# Patient Record
Sex: MALE | URBAN - METROPOLITAN AREA
[De-identification: names, ages, dates, MRNs, and addresses within clinical notes are randomized per-mention and may not be internally consistent; named-entity substitution may affect disease eponyms.]

---

## 2017-08-01 ENCOUNTER — EMERGENCY (EMERGENCY)
Facility: HOSPITAL | Age: 77
LOS: 1 days | Discharge: HOME | End: 2017-08-01
Attending: EMERGENCY MEDICINE | Admitting: EMERGENCY MEDICINE
Payer: MEDICARE

## 2017-08-01 VITALS
RESPIRATION RATE: 18 BRPM | OXYGEN SATURATION: 96 % | DIASTOLIC BLOOD PRESSURE: 93 MMHG | SYSTOLIC BLOOD PRESSURE: 164 MMHG | TEMPERATURE: 99 F | HEART RATE: 89 BPM

## 2017-08-01 VITALS
TEMPERATURE: 98 F | RESPIRATION RATE: 18 BRPM | DIASTOLIC BLOOD PRESSURE: 94 MMHG | OXYGEN SATURATION: 99 % | SYSTOLIC BLOOD PRESSURE: 148 MMHG | HEART RATE: 66 BPM

## 2017-08-01 DIAGNOSIS — R53.1 WEAKNESS: ICD-10-CM

## 2017-08-01 LAB
ALBUMIN SERPL ELPH-MCNC: 4 G/DL — SIGNIFICANT CHANGE UP (ref 3.3–5)
ALP SERPL-CCNC: 85 U/L — SIGNIFICANT CHANGE UP (ref 40–120)
ALT FLD-CCNC: 17 U/L RC — SIGNIFICANT CHANGE UP (ref 10–45)
ANION GAP SERPL CALC-SCNC: 11 MMOL/L — SIGNIFICANT CHANGE UP (ref 5–17)
APTT BLD: 34.6 SEC — SIGNIFICANT CHANGE UP (ref 27.5–37.4)
AST SERPL-CCNC: 17 U/L — SIGNIFICANT CHANGE UP (ref 10–40)
BILIRUB SERPL-MCNC: 0.8 MG/DL — SIGNIFICANT CHANGE UP (ref 0.2–1.2)
BUN SERPL-MCNC: 18 MG/DL — SIGNIFICANT CHANGE UP (ref 7–23)
CALCIUM SERPL-MCNC: 9.9 MG/DL — SIGNIFICANT CHANGE UP (ref 8.4–10.5)
CHLORIDE SERPL-SCNC: 104 MMOL/L — SIGNIFICANT CHANGE UP (ref 96–108)
CO2 SERPL-SCNC: 27 MMOL/L — SIGNIFICANT CHANGE UP (ref 22–31)
CREAT SERPL-MCNC: 1.2 MG/DL — SIGNIFICANT CHANGE UP (ref 0.5–1.3)
GLUCOSE SERPL-MCNC: 157 MG/DL — HIGH (ref 70–99)
HCT VFR BLD CALC: 47.7 % — SIGNIFICANT CHANGE UP (ref 39–50)
HGB BLD-MCNC: 15.7 G/DL — SIGNIFICANT CHANGE UP (ref 13–17)
INR BLD: 1.22 RATIO — HIGH (ref 0.88–1.16)
MCHC RBC-ENTMCNC: 28.9 PG — SIGNIFICANT CHANGE UP (ref 27–34)
MCHC RBC-ENTMCNC: 32.8 GM/DL — SIGNIFICANT CHANGE UP (ref 32–36)
MCV RBC AUTO: 88 FL — SIGNIFICANT CHANGE UP (ref 80–100)
PLATELET # BLD AUTO: 182 K/UL — SIGNIFICANT CHANGE UP (ref 150–400)
POTASSIUM SERPL-MCNC: 4.7 MMOL/L — SIGNIFICANT CHANGE UP (ref 3.5–5.3)
POTASSIUM SERPL-SCNC: 4.7 MMOL/L — SIGNIFICANT CHANGE UP (ref 3.5–5.3)
PROT SERPL-MCNC: 7.1 G/DL — SIGNIFICANT CHANGE UP (ref 6–8.3)
PROTHROM AB SERPL-ACNC: 13.3 SEC — HIGH (ref 9.8–12.7)
RBC # BLD: 5.42 M/UL — SIGNIFICANT CHANGE UP (ref 4.2–5.8)
RBC # FLD: 13.7 % — SIGNIFICANT CHANGE UP (ref 10.3–14.5)
SODIUM SERPL-SCNC: 142 MMOL/L — SIGNIFICANT CHANGE UP (ref 135–145)
WBC # BLD: 7.6 K/UL — SIGNIFICANT CHANGE UP (ref 3.8–10.5)
WBC # FLD AUTO: 7.6 K/UL — SIGNIFICANT CHANGE UP (ref 3.8–10.5)

## 2017-08-01 PROCEDURE — 93010 ELECTROCARDIOGRAM REPORT: CPT

## 2017-08-01 PROCEDURE — 70496 CT ANGIOGRAPHY HEAD: CPT | Mod: 26

## 2017-08-01 PROCEDURE — 99284 EMERGENCY DEPT VISIT MOD MDM: CPT | Mod: 25

## 2017-08-01 PROCEDURE — 70498 CT ANGIOGRAPHY NECK: CPT | Mod: 26

## 2017-08-01 PROCEDURE — 70496 CT ANGIOGRAPHY HEAD: CPT

## 2017-08-01 PROCEDURE — 80053 COMPREHEN METABOLIC PANEL: CPT

## 2017-08-01 PROCEDURE — 70450 CT HEAD/BRAIN W/O DYE: CPT | Mod: 26,59

## 2017-08-01 PROCEDURE — 93005 ELECTROCARDIOGRAM TRACING: CPT

## 2017-08-01 PROCEDURE — 85730 THROMBOPLASTIN TIME PARTIAL: CPT

## 2017-08-01 PROCEDURE — 70450 CT HEAD/BRAIN W/O DYE: CPT

## 2017-08-01 PROCEDURE — 99284 EMERGENCY DEPT VISIT MOD MDM: CPT | Mod: 25,GC

## 2017-08-01 PROCEDURE — 70498 CT ANGIOGRAPHY NECK: CPT

## 2017-08-01 PROCEDURE — 85610 PROTHROMBIN TIME: CPT

## 2017-08-01 PROCEDURE — 85027 COMPLETE CBC AUTOMATED: CPT

## 2017-08-01 RX ORDER — LEVOTHYROXINE SODIUM 125 MCG
0 TABLET ORAL
Qty: 0 | Refills: 0 | COMMUNITY

## 2017-08-01 RX ORDER — ATORVASTATIN CALCIUM 80 MG/1
0 TABLET, FILM COATED ORAL
Qty: 0 | Refills: 0 | COMMUNITY

## 2017-08-01 NOTE — CONSULT NOTE ADULT - ASSESSMENT
The patient is a 76 y M with PMH back surgery, hypothyroid, HLD, who presents with R sided weakness beginning in RUE and progressing to RLE since last night at 10 PM. The patient is a 76 y M with PMH back surgery, hypothyroid, HLD, who presents with R sided weakness beginning in RUE and progressing to RLE since last night at 10 PM. Pt reports that prior to experiencing the weakness, he was physically exerting himself and he also took an Ambien hours before he developed R leg weakness. On evaluation, pt's subjective weakness had resolved and on exam, there were no focal findings; strength was intact throughout. CTA and CTH done however images are pending. The patient is a 76 y M with PMH back surgery, hypothyroid, HLD, who presents with R sided weakness beginning in RUE and progressing to RLE since last night at 10 PM. Pt reports that prior to experiencing the weakness, he was physically exerting himself and he also took an Ambien hours before he developed R leg weakness. On evaluation, pt's subjective weakness had resolved and on exam, there were no focal findings; strength was intact throughout. CTA and CTH done however images are pending.  NIHSS 0, MRS 0

## 2017-08-01 NOTE — ED ADULT NURSE NOTE - OBJECTIVE STATEMENT
75yo male walked into ED a+ox3, c/o right sided weakness since yesterday afternoon, dizziness. Pt states "I was cleaning out my pool yesterday when I suddenly began having right arm weakness. I did not think anything of it at the time. I had trouble sleeping, so I took 2.5mg Ambien and went to sleep. When I woke up this morning, I had weakness in both upper and lower right extremities."  Pt states the weakness is resolving since this morning. Pt has equal strength in all extremities. No facial droop. +dizziness, mild. Pt denies HA, n/v/d, vision changes, numbness/tingling, CP, SOB, fevers, chills, palpitations, recent illnesses/travel, smoking, sick contacts, abd pain.  Neuro exam intact. Abd soft nontender nondistended. Lung sounds clear. Pt only has hx of hypothyroid, and hld takes Lipitor.

## 2017-08-01 NOTE — ED PROVIDER NOTE - MEDICAL DECISION MAKING DETAILS
FERNANDA Zamora MD: 77 y/o male with PMH HLD, h/o previous back surgery who is BIB wife for c/o R sided weakness. FERNANDA Zamora MD: 75 y/o male with PMH HLD, h/o previous back surgery who is BIB wife for c/o R sided weakness. Per patient, he noticed sx of weakness to his RUE last night at 10pm, which then proceeded to affect RLE. He did not tell wife last night. He woke up this AM with persistant feeling of RUE weakness and per wife, also c/o difficulty walking, so she brought him in for evaluation. Pt states that his sx have nearly resolved upon entering ED. Denies: facial droop, visual changes, speech disturbance, CP, SOB, dizziness/LH.  DDx: TIA, CVA, fatigue, metabolic/infectious d/o  Plan: labs, CTA head/neck, Neuro c/s

## 2017-08-01 NOTE — CONSULT NOTE ADULT - SUBJECTIVE AND OBJECTIVE BOX
Patient is a 76y old  Male who presents with a chief complaint of new onset right sided weakness.     HPI:  The patient is a 76 y M with PMH back surgery, hypothyroid, HLD, who presents with R sided weakness beginning in RUE and progressing to RLE since last night at 10 PM.        Neurological Review of Systems:  /No difficulty with language.  No vision loss or double vision.  No dizziness, vertigo or new hearing loss.  No difficulty with speech or swallowing.  No focal weakness.  No focal sensory changes.  No numbness or tingling in the bilateral lower extremities.  No difficulty with balance.  No difficulty with ambulation. /      MEDICATIONS  (STANDING):    MEDICATIONS  (PRN):    Allergies      PAST MEDICAL & SURGICAL HISTORY:  Back Surgery     FAMILY HISTORY:    SOCIAL HISTORY: non smoker    Review of Systems:  Constitutional: No generalized weakness. No fevers or chills.                    Eyes, Ears, Mouth, Throat: No vision loss   Respiratory: No shortness of breath or cough.                                Cardiovascular: No chest pain or palpitations  Gastrointestinal: No nausea or vomiting.                                         Genitourinary: No urinary incontinence or burning on urination.  Musculoskeletal: No joint pain.                                                           Dermatologic: No rash.  Neurological: as per HPI                                                                      Psychiatric: No behavioral problems.  Endocrine: No known hypoglycemia.               Hematologic/Lymphatic: No easy bleeding.    O:  Vital Signs Last 24 Hrs  T(C): 36.7 (01 Aug 2017 08:23), Max: 37.3 (01 Aug 2017 07:45)  T(F): 98.1 (01 Aug 2017 08:23), Max: 99.1 (01 Aug 2017 07:45)  HR: 75 (01 Aug 2017 08:23) (75 - 89)  BP: 149/91 (01 Aug 2017 08:23) (149/91 - 164/93)  BP(mean): --  RR: 18 (01 Aug 2017 08:23) (18 - 18)  SpO2: 98% (01 Aug 2017 08:23) (96% - 98%)    General Exam:   General appearance: No acute distress                 Cardiovascular: Pedal dorsalis pulses intact bilaterally    Mental Status:     Orientated to self, date and place.  Attention intact.  No dysarthria, aphasia or neglect.  Knowledge intact.  Registration intact.  Short and long term memory grossly intact.      Cranial Nerves:     CN I - not tested.  PERRL, EOMI, VFF, no nystagmus or diplopia.  No APD.  Fundi not visualized.  CN V1-3 intact to light touch and pinprick.  No facial asymmetry.  Hearing intact to finger rub bilaterally.  Tongue, uvula and palate midline.  Sternocleidomastoid and Trapezius intact bilaterally.    Motor:   Tone: normal.                  Strength intact throughout  No pronator drift bilaterally                      No dysmetria on finger-nose-finger or heel-shin-heel  No truncal ataxia.  No resting, postural or action tremor.  No myoclonus.    Sensation: intact to light touch, pinprick, vibration and proprioception    Deep Tendon Reflexes: 1+ bilateral biceps, triceps, brachioradialis, knee and ankle  Toes flexor bilaterally    Gait: normal and stable.  Rhomberg -rochelle.    Other:     LABS:                        15.7   7.6   )-----------( 182      ( 01 Aug 2017 08:21 )             47.7     08-01    142  |  104  |  18  ----------------------------<  157<H>  4.7   |  27  |  1.20    Ca    9.9      01 Aug 2017 08:21    TPro  7.1  /  Alb  4.0  /  TBili  0.8  /  DBili  x   /  AST  17  /  ALT  17  /  AlkPhos  85  08-01    PT/INR - ( 01 Aug 2017 08:21 )   PT: 13.3 sec;   INR: 1.22 ratio         PTT - ( 01 Aug 2017 08:21 )  PTT:34.6 sec        RADIOLOGY & ADDITIONAL STUDIES:    EKG:   CTH/CTA: pending Patient is a 76y old  Male who presents with a chief complaint of new onset right sided weakness.     HPI:  The patient is a 76 y M with PMH back surgery, angioplasty (25 yrs ago), hypothyroid, HLD, who presents with R sided weakness beginning in RUE and progressing to RLE since last night at 10 PM. Pt reports that yesterday he was exerting himself while fixing the plugs in his pool for a about a half hour. Pt reports that his right arm started to feel weak shortly thereafter but he did not make anything of out it and went to sleep. Pt says that he woke up from sleep around 1:30 AM and because he was having difficulty falling asleep, pt says that he took 2.5 mg of Ambien. Pt says that he then woke up again around 6 am and felt dizzy, weakness in his right leg and he was unable to walk to the bathroom. Pt says that he felt off balance as if he was going to fall and needed to hold on to furniture to walk. Pt says that his wife then decided to bring him into the ED.         Neurological Review of Systems:  No difficulty with language.  No vision loss or double vision. No vertigo or new hearing loss.  No difficulty with speech or swallowing.  No numbness or tingling in the bilateral lower extremities.       MEDICATIONS  (STANDING): Ambien, Lipitor levothyroxine, ASA 81       Allergies: NKDA      PAST MEDICAL & SURGICAL HISTORY:  Back Surgery   Angioplasty (25 yrs ago)     FAMILY HISTORY: none     SOCIAL HISTORY: non smoker, works as a real-estate     Review of Systems:  Constitutional: No fevers or chills.                    Eyes, Ears, Mouth, Throat: No vision loss   Respiratory: No shortness of breath or cough.                                Cardiovascular: No chest pain or palpitations  Gastrointestinal: No nausea or vomiting.                                         Genitourinary: No urinary incontinence.   Musculoskeletal: No joint pain.                                                           Dermatologic: No rash.  Neurological: as per HPI                                                                      Psychiatric: No behavioral problems.  Endocrine: No known hypoglycemia.               Hematologic/Lymphatic: No easy bleeding.    O:  Vital Signs Last 24 Hrs  T(C): 36.7 (01 Aug 2017 08:23), Max: 37.3 (01 Aug 2017 07:45)  T(F): 98.1 (01 Aug 2017 08:23), Max: 99.1 (01 Aug 2017 07:45)  HR: 75 (01 Aug 2017 08:23) (75 - 89)  BP: 149/91 (01 Aug 2017 08:23) (149/91 - 164/93)  BP(mean): --  RR: 18 (01 Aug 2017 08:23) (18 - 18)  SpO2: 98% (01 Aug 2017 08:23) (96% - 98%)    General Exam:   General appearance: No acute distress                 Cardiovascular: Pedal dorsalis pulses intact bilaterally    Mental Status:     Orientated to self, date and place.  Attention intact.  No dysarthria, aphasia or neglect.  Knowledge intact. Short and long term memory grossly intact.      Cranial Nerves:     CN I - not tested.  PERRL, EOMI, VFF, no nystagmus or diplopia.    CN V1-3 intact to light touch and pinprick.  No facial asymmetry.  Hearing intact to finger rub bilaterally.  Tongue, uvula and palate midline.  Sternocleidomastoid and Trapezius intact bilaterally.    Motor:   Tone: increased tone lower extremities L>R.              Strength intact throughout  No pronator drift bilaterally                      No dysmetria on finger-nose-finger or heel-shin-heel  No truncal ataxia.  No resting, postural or action tremor.  No myoclonus.    Sensation: intact to light touch, pinprick, vibration and proprioception    Deep Tendon Reflexes: 1+ bilateral biceps, triceps, brachioradialis, knee and ankle  Toes flexor bilaterally    Gait: normal and stable.  Rhomberg -rochelle.    Other:     LABS:                        15.7   7.6   )-----------( 182      ( 01 Aug 2017 08:21 )             47.7     08-01    142  |  104  |  18  ----------------------------<  157<H>  4.7   |  27  |  1.20    Ca    9.9      01 Aug 2017 08:21    TPro  7.1  /  Alb  4.0  /  TBili  0.8  /  DBili  x   /  AST  17  /  ALT  17  /  AlkPhos  85  08-01    PT/INR - ( 01 Aug 2017 08:21 )   PT: 13.3 sec;   INR: 1.22 ratio         PTT - ( 01 Aug 2017 08:21 )  PTT:34.6 sec        RADIOLOGY & ADDITIONAL STUDIES:    EKG:   CTH/CTA: pending Patient is a 76y old  Male who presents with a chief complaint of new onset right sided weakness.     HPI:  The patient is a 76 y M with PMH back surgery (last revision 1 yr ago at [facility]), angioplasty (25 yrs ago), hypothyroid, HLD, who presents with R sided weakness beginning in RUE and progressing to RLE since last night at 10 PM. Pt reports that yesterday he was exerting himself while fixing the plugs in his pool for a about a half hour. Pt reports that his right arm started to feel weak shortly thereafter but he did not make anything of out it and went to sleep. Pt says that he woke up from sleep around 1:30 AM and because he was having difficulty falling asleep, pt says that he took 2.5 mg of Ambien. Pt says that he then woke up again around 6 am and felt dizzy, weakness in his right leg and he was unable to walk to the bathroom. Pt says that he felt off balance as if he was going to fall and needed to hold on to furniture to walk. Pt says that his wife then decided to bring him into the ED.         Neurological Review of Systems:  No difficulty with language.  No vision loss or double vision. No vertigo or new hearing loss.  No difficulty with speech or swallowing.  No numbness or tingling in the bilateral lower extremities.       MEDICATIONS  (STANDING): Ambien, Lipitor levothyroxine, ASA 81       Allergies: NKDA      PAST MEDICAL & SURGICAL HISTORY:  Back Surgery   Angioplasty (25 yrs ago)     FAMILY HISTORY: none     SOCIAL HISTORY: non smoker, works as a real-estate     Review of Systems:  Constitutional: No fevers or chills.                    Eyes, Ears, Mouth, Throat: No vision loss   Respiratory: No shortness of breath or cough.                                Cardiovascular: No chest pain or palpitations  Gastrointestinal: No nausea or vomiting.                                         Genitourinary: No urinary incontinence.   Musculoskeletal: No joint pain.                                                           Dermatologic: No rash.  Neurological: as per HPI                                                                      Psychiatric: No behavioral problems.  Endocrine: No known hypoglycemia.               Hematologic/Lymphatic: No easy bleeding.    O:  Vital Signs Last 24 Hrs  T(C): 36.7 (01 Aug 2017 08:23), Max: 37.3 (01 Aug 2017 07:45)  T(F): 98.1 (01 Aug 2017 08:23), Max: 99.1 (01 Aug 2017 07:45)  HR: 75 (01 Aug 2017 08:23) (75 - 89)  BP: 149/91 (01 Aug 2017 08:23) (149/91 - 164/93)  BP(mean): --  RR: 18 (01 Aug 2017 08:23) (18 - 18)  SpO2: 98% (01 Aug 2017 08:23) (96% - 98%)    General Exam:   General appearance: No acute distress                 Cardiovascular: Pedal dorsalis pulses intact bilaterally    Mental Status:     Orientated to self, date and place.  Attention intact.  No dysarthria, aphasia or neglect.  Knowledge intact. Short and long term memory grossly intact.      Cranial Nerves:     CN I - not tested.  PERRL, EOMI, VFF, no nystagmus or diplopia.    CN V1-3 intact to light touch and pinprick.  No facial asymmetry.  Hearing intact to finger rub bilaterally.  Tongue, uvula and palate midline.  Sternocleidomastoid and Trapezius intact bilaterally.    Motor:   Tone: increased tone lower extremities L>R.              Strength intact throughout  No pronator drift bilaterally                      No dysmetria on finger-nose-finger or heel-shin-heel  No truncal ataxia.  No resting, postural or action tremor.  No myoclonus.    Sensation: intact to light touch, pinprick, vibration and proprioception    Deep Tendon Reflexes: 1+ bilateral biceps, triceps, brachioradialis, knee and ankle  Toes flexor bilaterally    Gait: normal and stable.  Rhomberg -rochelle.    Other:     LABS:                        15.7   7.6   )-----------( 182      ( 01 Aug 2017 08:21 )             47.7     08-01    142  |  104  |  18  ----------------------------<  157<H>  4.7   |  27  |  1.20    Ca    9.9      01 Aug 2017 08:21    TPro  7.1  /  Alb  4.0  /  TBili  0.8  /  DBili  x   /  AST  17  /  ALT  17  /  AlkPhos  85  08-01    PT/INR - ( 01 Aug 2017 08:21 )   PT: 13.3 sec;   INR: 1.22 ratio         PTT - ( 01 Aug 2017 08:21 )  PTT:34.6 sec        RADIOLOGY & ADDITIONAL STUDIES:    EKG:   CTH/CTA: pending

## 2017-08-01 NOTE — ED PROVIDER NOTE - PROGRESS NOTE DETAILS
FERNANDA Zamora MD: Per Neurology, if CTA negative, pt may be d/c home to f/u with Dr Smith (Neuro) as outpt.

## 2017-08-01 NOTE — ED PROVIDER NOTE - OBJECTIVE STATEMENT
Patient is 76 y M with PMH back surgery, hypothyroid, hld, presenting with R sided weakness beginning in RUE and progressing to RLE since last night at 10 PM. Took ambien last night. no headache, no vision changes, no tingling or numbness. Feels better now.     PMD:   ROS: Denies fever, palpitations, chills, recent sickness, HA, vision changes, cough, SOB, chest pain, abdominal pain, n/v/d/c, dysuria, hematuria, rash, new joint aches, sick contacts, and recent travel.

## 2017-08-01 NOTE — ED PROVIDER NOTE - PHYSICAL EXAMINATION
Gen: NAD, AOx3  Head: NCAT  HEENT: PERRL, oral mucosa moist, normal conjunctiva  Lung: CTAB, no respiratory distress  CV: rrr, no murmurs, Normal perfusion  Abd: soft, NTND, no CVA tenderness  MSK: No edema, no visible deformities, entire spine without midline tenderness and with full ROM, no paravertebral tenderness, no stepoffs or masses, negative straight leg raise bilaterally   Neuro: No focal neurologic deficits, CN intact, motor and sensation intact, no cerebellar signs   Skin: No rash   Psych: normal affect

## 2017-08-01 NOTE — CONSULT NOTE ADULT - PROBLEM SELECTOR RECOMMENDATION 9
CTA, CTH pending   -weakness now fully resolved, will likely recommend d/c pt with outpatient neurology follow-up with Dr. Seo.  -c/w ASA 81 []CTA, CTH pending  []weakness now fully resolved, will likely recommend d/c pt with outpatient neurology follow-up with Dr. Seo.  []Cont Asa 81mg.  []F/u with outpatient [facility] Cardiologist  -c/w ASA 81

## 2020-12-22 ENCOUNTER — TRANSCRIPTION ENCOUNTER (OUTPATIENT)
Age: 80
End: 2020-12-22

## 2023-07-03 NOTE — ED PROVIDER NOTE - CAS EDP CONSULT REGARDING 1
No care due was identified.  [facility] Embedded Care Due Messages. Reference number: [id number].   7/03/2023 8:52:00 AM CDT   consult